# Patient Record
Sex: FEMALE | Race: AMERICAN INDIAN OR ALASKA NATIVE
[De-identification: names, ages, dates, MRNs, and addresses within clinical notes are randomized per-mention and may not be internally consistent; named-entity substitution may affect disease eponyms.]

---

## 2019-06-10 ENCOUNTER — HOSPITAL ENCOUNTER (EMERGENCY)
Dept: HOSPITAL 5 - ED | Age: 24
LOS: 1 days | Discharge: HOME | End: 2019-06-11
Payer: COMMERCIAL

## 2019-06-10 VITALS — SYSTOLIC BLOOD PRESSURE: 132 MMHG | DIASTOLIC BLOOD PRESSURE: 87 MMHG

## 2019-06-10 DIAGNOSIS — N76.0: Primary | ICD-10-CM

## 2019-06-10 DIAGNOSIS — N93.8: ICD-10-CM

## 2019-06-10 LAB
BASOPHILS # (AUTO): 0 K/MM3 (ref 0–0.1)
BASOPHILS NFR BLD AUTO: 0.5 % (ref 0–1.8)
BILIRUB UR QL STRIP: (no result)
BLOOD UR QL VISUAL: (no result)
BUN SERPL-MCNC: 12 MG/DL (ref 7–17)
BUN/CREAT SERPL: 17 %
CALCIUM SERPL-MCNC: 9.1 MG/DL (ref 8.4–10.2)
EOSINOPHIL # BLD AUTO: 0 K/MM3 (ref 0–0.4)
EOSINOPHIL NFR BLD AUTO: 0.7 % (ref 0–4.3)
HCT VFR BLD CALC: 33.5 % (ref 30.3–42.9)
HEMOLYSIS INDEX: 3
HGB BLD-MCNC: 11 GM/DL (ref 10.1–14.3)
LYMPHOCYTES # BLD AUTO: 1.8 K/MM3 (ref 1.2–5.4)
LYMPHOCYTES NFR BLD AUTO: 26.5 % (ref 13.4–35)
MCHC RBC AUTO-ENTMCNC: 33 % (ref 30–34)
MCV RBC AUTO: 85 FL (ref 79–97)
MONOCYTES # (AUTO): 0.5 K/MM3 (ref 0–0.8)
MONOCYTES % (AUTO): 7.1 % (ref 0–7.3)
MUCOUS THREADS #/AREA URNS HPF: (no result) /HPF
PH UR STRIP: 7 [PH] (ref 5–7)
PLATELET # BLD: 309 K/MM3 (ref 140–440)
PROT UR STRIP-MCNC: (no result) MG/DL
RBC # BLD AUTO: 3.95 M/MM3 (ref 3.65–5.03)
RBC #/AREA URNS HPF: 3 /HPF (ref 0–6)
UROBILINOGEN UR-MCNC: < 2 MG/DL (ref ?–2)
WBC #/AREA URNS HPF: < 1 /HPF (ref 0–6)

## 2019-06-10 PROCEDURE — 81001 URINALYSIS AUTO W/SCOPE: CPT

## 2019-06-10 PROCEDURE — 87591 N.GONORRHOEAE DNA AMP PROB: CPT

## 2019-06-10 PROCEDURE — 80048 BASIC METABOLIC PNL TOTAL CA: CPT

## 2019-06-10 PROCEDURE — 99284 EMERGENCY DEPT VISIT MOD MDM: CPT

## 2019-06-10 PROCEDURE — 96372 THER/PROPH/DIAG INJ SC/IM: CPT

## 2019-06-10 PROCEDURE — 81025 URINE PREGNANCY TEST: CPT

## 2019-06-10 PROCEDURE — 84702 CHORIONIC GONADOTROPIN TEST: CPT

## 2019-06-10 PROCEDURE — 87210 SMEAR WET MOUNT SALINE/INK: CPT

## 2019-06-10 PROCEDURE — 85025 COMPLETE CBC W/AUTO DIFF WBC: CPT

## 2019-06-10 PROCEDURE — 36415 COLL VENOUS BLD VENIPUNCTURE: CPT

## 2019-06-10 NOTE — EMERGENCY DEPARTMENT REPORT
ED Female  HPI





- General


Chief complaint: Vaginal Bleeding


Stated complaint: LIGHT HEADED /DIZZY/CRAMPS


Time Seen by Provider: 06/10/19 22:33


Source: patient


Mode of arrival: Ambulatory


Limitations: No Limitations





- History of Present Illness


Initial comments: 





23-year-old female presents to ED with complaint of vaginal bleeding.  Patient 

reports 10 day history of vaginal spotting with associated cramping.  Denies 

vaginal discharge.  Not currently taking any birth control.





GYN: [facility]


MD Complaint: vaginal bleeding


-: days(s) (10)


Location: suprapubic


Severity: mild


Consistency: constant


Improves with: none


Worsens with: none


Associated Symptoms: vaginal bleeding, abdominal pain.  denies: vaginal 

discharge, nausea/vomiting, fever/chills, dysuria, shortness of breath





- Related Data


                                  Previous Rx's











 Medication  Instructions  Recorded  Last Taken  Type


 


Fluconazole [Diflucan TAB] 150 mg PO ONCE #1 tablet 06/10/19 Unknown Rx


 


metroNIDAZOLE [Flagyl] 500 mg PO Q12HR #14 tab 06/10/19 Unknown Rx











                                    Allergies











Allergy/AdvReac Type Severity Reaction Status Date / Time


 


No Known Allergies Allergy   Verified 01/27/14 16:32














ED Review of Systems


ROS: 


Stated complaint: LIGHT HEADED /DIZZY/CRAMPS


Other details as noted in HPI





Comment: All other systems reviewed and negative


Constitutional: denies: chills, fever


Respiratory: denies: shortness of breath


Cardiovascular: denies: chest pain


Gastrointestinal: abdominal pain.  denies: nausea, vomiting


Genitourinary: abnormal menses





ED Past Medical Hx





- Past Medical History


Previous Medical History?: Yes


Hx Hypertension: No


Hx Diabetes: No


Hx Deep Vein Thrombosis: No


Hx Renal Disease: No


Hx Sickle Cell Disease: No


Hx Seizures: No


Hx Asthma: No


Hx HIV: No





- Surgical History


Past Surgical History?: No





- Social History


Smoking Status: Never Smoker


Substance Use Type: None





- Medications


Home Medications: 


                                Home Medications











 Medication  Instructions  Recorded  Confirmed  Last Taken  Type


 


Fluconazole [Diflucan TAB] 150 mg PO ONCE #1 tablet 06/10/19  Unknown Rx


 


metroNIDAZOLE [Flagyl] 500 mg PO Q12HR #14 tab 06/10/19  Unknown Rx














ED Physical Exam





- General


Limitations: No Limitations


General appearance: alert, in no apparent distress





- Head


Head exam: Present: atraumatic, normocephalic





- Eye


Eye exam: Present: normal appearance





- ENT


ENT exam: Present: mucous membranes moist





- Neck


Neck exam: Present: normal inspection





- Respiratory


Respiratory exam: Present: normal lung sounds bilaterally.  Absent: respiratory 

distress





- Cardiovascular


Cardiovascular Exam: Present: regular rate, normal rhythm





- GI/Abdominal


GI/Abdominal exam: Present: soft, tenderness (suprapubic).  Absent: distended





- 


External exam: Present: normal external exam


Speculum exam: Present: vaginal discharge


Bi-manual exam: Absent: cervical motion tendernes





- Extremities Exam


Extremities exam: Present: normal inspection





- Neurological Exam


Neurological exam: Present: alert, oriented X3, normal gait.  Absent: CN II-XII 

intact, motor sensory deficit





- Psychiatric


Psychiatric exam: Present: normal affect, normal mood





- Skin


Skin exam: Present: warm, dry, intact, normal color





ED Course


                                   Vital Signs











  06/10/19 06/10/19 06/10/19





  19:14 19:18 22:38


 


Temperature 99.6 F 99.6 F 


 


Pulse Rate 82 78 


 


Respiratory 18 18 18





Rate   


 


Blood Pressure 132/87 132/87 


 


O2 Sat by Pulse 98 99 





Oximetry   














  06/10/19 06/10/19





  22:44 22:45


 


Temperature  


 


Pulse Rate  


 


Respiratory  





Rate  


 


Blood Pressure  


 


O2 Sat by Pulse 99 98





Oximetry  














ED Medical Decision Making





- Lab Data


Result diagrams: 


                                 06/10/19 19:30





                                 06/10/19 19:30





- Medical Decision Making





- vitals stable


- Hb normal


- pelvic exam shows brownish discharge


- GC/ Chlam empiric abx given


- BV on wet prep


- advised outpt GYN f/u for DUB





- Differential Diagnosis


pregnancy, ectopic, DUB


Critical care attestation.: 


If time is entered above; I have spent that time in minutes in the direct care 

of this critically ill patient, excluding procedure time.








ED Disposition


Clinical Impression: 


 DUB (dysfunctional uterine bleeding), Bacterial vaginosis





Disposition: DC-01 TO HOME OR SELFCARE


Is pt being admited?: No


Condition: Stable


Instructions:  Bacterial Vaginosis (ED)


Prescriptions: 


Fluconazole [Diflucan TAB] 150 mg PO ONCE #1 tablet


metroNIDAZOLE [Flagyl] 500 mg PO Q12HR #14 tab


Referrals: 


PRIMARY CARE,MD [Referring] - 3-5 Days


Forms:  STI Treatment and Prevention


Time of Disposition: 23:58

## 2020-10-01 ENCOUNTER — HOSPITAL ENCOUNTER (EMERGENCY)
Dept: HOSPITAL 5 - ED | Age: 25
LOS: 1 days | Discharge: HOME | End: 2020-10-02
Payer: MEDICAID

## 2020-10-01 VITALS — SYSTOLIC BLOOD PRESSURE: 138 MMHG | DIASTOLIC BLOOD PRESSURE: 84 MMHG

## 2020-10-01 DIAGNOSIS — Z79.1: ICD-10-CM

## 2020-10-01 DIAGNOSIS — Z79.899: ICD-10-CM

## 2020-10-01 DIAGNOSIS — N92.0: Primary | ICD-10-CM

## 2020-10-01 DIAGNOSIS — N93.8: ICD-10-CM

## 2020-10-01 LAB
BASOPHILS # (AUTO): 0 K/MM3 (ref 0–0.1)
BASOPHILS NFR BLD AUTO: 0.5 % (ref 0–1.8)
BILIRUB UR QL STRIP: (no result)
BLOOD UR QL VISUAL: (no result)
EOSINOPHIL # BLD AUTO: 0 K/MM3 (ref 0–0.4)
EOSINOPHIL NFR BLD AUTO: 0.4 % (ref 0–4.3)
HCT VFR BLD CALC: 31.2 % (ref 30.3–42.9)
HGB BLD-MCNC: 10.1 GM/DL (ref 10.1–14.3)
LYMPHOCYTES # BLD AUTO: 1.7 K/MM3 (ref 1.2–5.4)
LYMPHOCYTES NFR BLD AUTO: 23.8 % (ref 13.4–35)
MCHC RBC AUTO-ENTMCNC: 32 % (ref 30–34)
MCV RBC AUTO: 82 FL (ref 79–97)
MONOCYTES # (AUTO): 0.4 K/MM3 (ref 0–0.8)
MONOCYTES % (AUTO): 5.8 % (ref 0–7.3)
MUCOUS THREADS #/AREA URNS HPF: (no result) /HPF
PH UR STRIP: 6 [PH] (ref 5–7)
PLATELET # BLD: 337 K/MM3 (ref 140–440)
PROT UR STRIP-MCNC: (no result) MG/DL
RBC # BLD AUTO: 3.81 M/MM3 (ref 3.65–5.03)
RBC #/AREA URNS HPF: 100 /HPF (ref 0–6)
UROBILINOGEN UR-MCNC: < 2 MG/DL (ref ?–2)
WBC #/AREA URNS HPF: 1 /HPF (ref 0–6)

## 2020-10-01 PROCEDURE — 81001 URINALYSIS AUTO W/SCOPE: CPT

## 2020-10-01 PROCEDURE — 86901 BLOOD TYPING SEROLOGIC RH(D): CPT

## 2020-10-01 PROCEDURE — 76815 OB US LIMITED FETUS(S): CPT

## 2020-10-01 PROCEDURE — 84703 CHORIONIC GONADOTROPIN ASSAY: CPT

## 2020-10-01 PROCEDURE — 86900 BLOOD TYPING SEROLOGIC ABO: CPT

## 2020-10-01 PROCEDURE — 85025 COMPLETE CBC W/AUTO DIFF WBC: CPT

## 2020-10-01 PROCEDURE — 36415 COLL VENOUS BLD VENIPUNCTURE: CPT

## 2020-10-01 NOTE — ULTRASOUND REPORT
ULTRASOUND OBSTETRIC LIMITED 



INDICATION / CLINICAL INFORMATION:

Vaginal bleeding.

Clinical Gestational Age (GA): 1 weeks. 4 days



COMPARISON:

None available.



FINDINGS:



No evidence of intrauterine pregnancy. The uterus is within normal limits for size and demonstrates n
o evidence of focal lesions. The bilateral ovaries are not well seen on this exam secondary to patien
t body habitus and overlying bowel gas. The visualized portions of the adnexa are unremarkable .



ADDITIONAL FINDINGS: None.



IMPRESSION:

1. No evidence of intrauterine pregnancy, however this is likely secondary to the early dating (1 wee
k 4 days). Correlation with pregnancy test and beta hCG values is recommended. Follow-up ultrasound f
or confirmation of intrauterine pregnancy is also recommended.



Signer Name: Nathaniel Teague MD 

Signed: 10/1/2020 11:01 PM

Workstation Name: [id number]

## 2020-10-01 NOTE — EMERGENCY DEPARTMENT REPORT
ED Female  HPI





- General


Chief complaint: Vaginal Bleeding


Stated complaint: VAG BLEEDING


Source: patient


Mode of arrival: Ambulatory


Limitations: No Limitations





- History of Present Illness


Initial comments: 





Patient is a A1 25-year-old -American female with no past medical 

history presents to the ED with complaint of acute onset persistent intermittent

vaginal bleeding and suprapubic pressure and pain for the last 1 month.  Patient

states that she initially thought that the bleeding was from her menstrual cycle

but that the bleeding has been persistent intermittent, worse with sexual 

intercourse.  Patient denies dyspareunia, vaginal discharge, dysuria, urinary 

frequency and urgency, nausea, vomiting, diarrhea, dizziness, traumatic injury, 

fever, chills, cough, chest pain or shortness of breath and low back pain.


MD Complaint: vaginal bleeding, pelvic pain


-: Gradual, month(s) (1)


Location: suprapubic, other (vaginal)


Radiation: suprapubic


Severity: moderate


Severity scale (0 -10): 3


Quality: cramping, dull


Consistency: intermittent


Improves with: none


Worsens with: intercourse


Are you Pregnant Now?: No


Associated Symptoms: denies other symptoms, vaginal bleeding, abdominal pain.  

denies: vaginal discharge, headaches, loss of appetite, hematuria, rash, 

shortness of breath, syncope, other





- Related Data


Sexually active: Yes


: 1


Para: 0


A: 1


                                  Previous Rx's











 Medication  Instructions  Recorded  Last Taken  Type


 


Fluconazole (Nf) [Diflucan TAB] 150 mg PO ONCE #1 tablet 06/10/19 Unknown Rx


 


metroNIDAZOLE [Flagyl] 500 mg PO Q12HR #14 tab 06/10/19 Unknown Rx


 


Ibuprofen [Motrin] 800 mg PO Q8HR PRN #30 tablet 10/02/20 Unknown Rx


 


medroxyPROGESTERone ACETATE 10 mg PO DAILY #10 tablet 10/02/20 Unknown Rx





[Provera]    











                                    Allergies











Allergy/AdvReac Type Severity Reaction Status Date / Time


 


No Known Allergies Allergy   Verified 14 16:32














ED Review of Systems


ROS: 


Stated complaint: VAG BLEEDING


Other details as noted in HPI





Constitutional: denies: chills, fever


Eyes: denies: eye pain, eye discharge, vision change


ENT: denies: ear pain, throat pain


Respiratory: denies: cough, shortness of breath, wheezing


Cardiovascular: denies: chest pain, palpitations


Endocrine: no symptoms reported


Gastrointestinal: abdominal pain (Suprapubic pain).  denies: nausea, vomiting, 

diarrhea


Genitourinary: hematuria, abnormal menses (Vaginal bleeding).  denies: urgency, 

dysuria, discharge


Musculoskeletal: denies: back pain, joint swelling, arthralgia


Skin: denies: rash, lesions


Neurological: denies: headache, weakness, paresthesias


Psychiatric: denies: anxiety, depression


Hematological/Lymphatic: denies: easy bleeding, easy bruising





ED Past Medical Hx





- Past Medical History


Previous Medical History?: No


Hx Hypertension: No


Hx Diabetes: No


Hx Deep Vein Thrombosis: No


Hx Renal Disease: No


Hx Sickle Cell Disease: No


Hx Seizures: No


Hx Asthma: No


Hx HIV: No





- Surgical History


Past Surgical History?: No





- Social History


Smoking Status: Never Smoker


Substance Use Type: None





- Medications


Home Medications: 


                                Home Medications











 Medication  Instructions  Recorded  Confirmed  Last Taken  Type


 


Fluconazole (Nf) [Diflucan TAB] 150 mg PO ONCE #1 tablet 06/10/19  Unknown Rx


 


metroNIDAZOLE [Flagyl] 500 mg PO Q12HR #14 tab 06/10/19  Unknown Rx


 


Ibuprofen [Motrin] 800 mg PO Q8HR PRN #30 tablet 10/02/20  Unknown Rx


 


medroxyPROGESTERone ACETATE 10 mg PO DAILY #10 tablet 10/02/20  Unknown Rx





[Provera]     














ED Physical Exam





- General


Limitations: No Limitations


General appearance: alert, in no apparent distress





- Head


Head exam: Present: atraumatic, normocephalic, normal inspection





- Eye


Eye exam: Present: normal appearance, PERRL, EOMI


Pupils: Present: normal accommodation





- ENT


ENT exam: Present: normal exam, normal orophraynx, mucous membranes moist, TM's 

normal bilaterally, normal external ear exam





- Neck


Neck exam: Present: normal inspection, full ROM





- Respiratory


Respiratory exam: Present: normal lung sounds bilaterally.  Absent: respiratory 

distress, wheezes, rales, rhonchi, chest wall tenderness, accessory muscle use, 

decreased breath sounds





- Cardiovascular


Cardiovascular Exam: Present: regular rate, normal rhythm, normal heart sounds. 

 Absent: systolic murmur, diastolic murmur, rubs, gallop





- GI/Abdominal


GI/Abdominal exam: Present: soft, normal bowel sounds.  Absent: tenderness, 

guarding, rebound, hyperactive bowel sounds, hypoactive bowel sounds





- 


Bi-manual exam: Present: other (Pelvic exam deferred, patient prefers OB/GYN)





- Extremities Exam


Extremities exam: Present: normal inspection, full ROM, normal capillary refill.

  Absent: pedal edema, joint swelling, calf tenderness





- Back Exam


Back exam: Present: normal inspection, full ROM.  Absent: tenderness, CVA 

tenderness (R), CVA tenderness (L), muscle spasm, paraspinal tenderness





- Neurological Exam


Neurological exam: Present: alert, oriented X3, CN II-XII intact, normal gait, 

reflexes normal





- Psychiatric


Psychiatric exam: Present: normal affect, normal mood





- Skin


Skin exam: Present: warm, dry, intact, normal color.  Absent: rash





ED Course





                                   Vital Signs











  10/01/20





  19:23


 


Temperature 98.8 F


 


Pulse Rate 80


 


Respiratory 20





Rate 


 


Blood Pressure 138/84


 


O2 Sat by Pulse 97





Oximetry 














ED Medical Decision Making





- Lab Data


Result diagrams: 


                                 10/01/20 19:58








- Radiology Data


Radiology results: report reviewed, image reviewed





Findings





[facility] 


[address] [place] [address] 





Ultrasound Report 


Signed 





 Patient: JAIMIE MARTINEZ MR#: [id number] 


 [id number] 


 : 1995 Acct:[id number] 





 Age/Sex: 25 / F ADM Date: 10/01/20 





 Loc: ED 


 Attending Dr: 








 Ordering Physician: JESSICA HOWARD 


 Date of Service: 10/01/20 


 Procedure(s): US OB limited 


 Accession Number(s): [id number] 





 cc: JESSICA HOWARD 








 ULTRASOUND OBSTETRIC LIMITED 





INDICATION / CLINICAL INFORMATION: 


Vaginal bleeding. 


Clinical Gestational Age (GA): 1 weeks. 4 days 





COMPARISON: 


None available. 





FINDINGS: 





No evidence of intrauterine pregnancy. The uterus is within normal limits for 

size and demonstrates


 no evidence of focal lesions. The bilateral ovaries are not well seen on this 

exam secondary to 


 patient body habitus and overlying bowel gas. The visualized portions of the 

adnexa are unremarkable


 . 





ADDITIONAL FINDINGS: None. 





IMPRESSION: 


1. No evidence of intrauterine pregnancy, however this is likely secondary to 

the early dating (1 


 week 4 days). Correlation with pregnancy test and beta hCG values is 

recommended. Follow-up 


 ultrasound for confirmation of intrauterine pregnancy is also recommended. 





Signer Name: Nathaniel Whitaker MD 


Signed: 10/1/2020 11:01 PM 


Workstation Name: [id number] 








 Transcribed By:  


 Dictated By: NATHANIEL T. SHERMAN 


 Electronically Authenticated By: NATHANIEL WHITAKER 


 Signed Date/Time: 10/01/20 2301 











 DD/DT: 10/01/20 2256 


 TD/TT: 





- Medical Decision Making





This is a A1 25-year-old -American female with no past medical 

history presents to the ED with complaint of acute onset persistent intermittent

 vaginal bleeding and suprapubic pressure and pain for the last 1 month.  

Patient states that she initially thought that the bleeding was from her 

menstrual cycle but that the bleeding has been persistent intermittent, worse 

with sexual intercourse.  In the ED, patient is alert and oriented x3 and is not

 in distress.  Lab test results were reviewed and are all nonactionable.  

Patient was treated for pain in the ED and transvaginal ultrasound showed no 

acute abnormalities.  Patient was discharged home on medications including 

medroxyprogesterone and was advised to follow-up with her OB/GYN physician as 

previously scheduled for October 15, 2020 for further evaluation.  Patient was 

advised return to the ED immediately if symptoms get worse.





- Differential Diagnosis


Pregnancy; metrorrhagia; DU B; uterine fibroids; cystitis; ovarian cyst


Critical care attestation.: 


If time is entered above; I have spent that time in minutes in the direct care 

of this critically ill patient, excluding procedure time.








ED Disposition


Clinical Impression: 


 Dysfunctional uterine hemorrhage, Menorrhagia with irregular cycle





Disposition:  TO HOME OR SELFCARE


Is pt being admited?: No


Does the pt Need Aspirin: No


Condition: Stable


Instructions:  Menstruation (ED), Menorrhagia (ED)


Additional Instructions: 


All lab test results are unremarkable.  Transvaginal ultrasound is unremarkable 

with no acute abnormalities.  Therefore take medication as advised, follow-up 

with your OB/GYN physician in 5 to 7 days for reevaluation.  Return to the ED 

immediately if symptoms get worse.


Prescriptions: 


Ibuprofen [Motrin] 800 mg PO Q8HR PRN #30 tablet


 PRN Reason: Pain , Severe (7-10)


medroxyPROGESTERone ACETATE [Provera] 10 mg PO DAILY #10 tablet


Referrals: 


JER HARVEY MD [Staff Physician] - 3-5 Days


Time of Disposition: 00:00


Print Language: ENGLISH

## 2020-12-28 ENCOUNTER — HOSPITAL ENCOUNTER (EMERGENCY)
Dept: HOSPITAL 5 - ED | Age: 25
LOS: 1 days | End: 2020-12-29
Payer: SELF-PAY

## 2020-12-28 VITALS — SYSTOLIC BLOOD PRESSURE: 131 MMHG | DIASTOLIC BLOOD PRESSURE: 91 MMHG

## 2020-12-28 DIAGNOSIS — Z53.21: ICD-10-CM

## 2020-12-28 DIAGNOSIS — R51.9: Primary | ICD-10-CM

## 2020-12-28 DIAGNOSIS — M79.10: ICD-10-CM

## 2020-12-28 PROCEDURE — 70450 CT HEAD/BRAIN W/O DYE: CPT

## 2020-12-28 NOTE — EMERGENCY DEPARTMENT REPORT
Blank Doc





- Documentation


Documentation: 





25-year-old female that presents with right sided headache and weakness.  Stated

has been taking OTC medications with no relief.  Denies any cough.  Rashard hx of 

headache like this.





This initial assessment/diagnostic orders/clinical plan/treatment(s) is/are 

subject to change based on patient's health status, clinical progression and re-

assessment by fellow clinical providers in the ED.  Further treatment and workup

at subsequent clinical providers discretion.  Patient/guardians urged not to 

elope from the ED as their condition may be serious if not clinically assessed 

and managed.  Initial orders include:


1- Patient sent to [facility] for further evaluation and treatment


2- CT head

## 2020-12-28 NOTE — CAT SCAN REPORT
CT BRAIN: 12/28/2020



INDICATION / CLINICAL INFORMATION:

Right-sided headache.



COMPARISON: 

4/17/2015



FINDINGS:



BRAIN/INTRACRANIAL STRUCTURES: Unenhanced CT images of the brain demonstrate no evidence of acute int
racranial abnormality.



Ventricles and sulci are normal in size and shape.



There is no evidence of ischemic injury, hemorrhage, or mass. There are no abnormal extra-axial fluid
 collections.



There is been no significant change when compared to 4/17/2015.





EXTRACRANIAL STRUCTURES: Unremarkable.







IMPRESSION:

 Negative unenhanced CT of the brain.









All CT scans at this location are performed using dose reduction to ALARA by means of automated expos
ure control.



Signer Name: Jayant Alex MD 

Signed: 12/28/2020 4:49 PM

Workstation Name: [id number]

## 2021-02-02 ENCOUNTER — HOSPITAL ENCOUNTER (EMERGENCY)
Dept: HOSPITAL 5 - ED | Age: 26
Discharge: HOME | End: 2021-02-02
Payer: SELF-PAY

## 2021-02-02 VITALS — SYSTOLIC BLOOD PRESSURE: 143 MMHG | DIASTOLIC BLOOD PRESSURE: 86 MMHG

## 2021-02-02 DIAGNOSIS — Z3A.11: ICD-10-CM

## 2021-02-02 DIAGNOSIS — O20.0: Primary | ICD-10-CM

## 2021-02-02 DIAGNOSIS — O23.41: ICD-10-CM

## 2021-02-02 DIAGNOSIS — Z79.899: ICD-10-CM

## 2021-02-02 LAB
ALBUMIN SERPL-MCNC: 3.8 G/DL (ref 3.9–5)
ALT SERPL-CCNC: 13 UNITS/L (ref 7–56)
BACTERIA #/AREA URNS HPF: (no result) /HPF
BASOPHILS # (AUTO): 0 K/MM3 (ref 0–0.1)
BASOPHILS NFR BLD AUTO: 0.4 % (ref 0–1.8)
BILIRUB UR QL STRIP: (no result)
BLOOD UR QL VISUAL: (no result)
BUN SERPL-MCNC: 12 MG/DL (ref 7–17)
BUN/CREAT SERPL: 20 %
CALCIUM SERPL-MCNC: 9.1 MG/DL (ref 8.4–10.2)
EOSINOPHIL # BLD AUTO: 0 K/MM3 (ref 0–0.4)
EOSINOPHIL NFR BLD AUTO: 0.4 % (ref 0–4.3)
HCT VFR BLD CALC: 29.3 % (ref 30.3–42.9)
HEMOLYSIS INDEX: 1
HGB BLD-MCNC: 10.2 GM/DL (ref 10.1–14.3)
LYMPHOCYTES # BLD AUTO: 2.2 K/MM3 (ref 1.2–5.4)
LYMPHOCYTES NFR BLD AUTO: 32.5 % (ref 13.4–35)
MCHC RBC AUTO-ENTMCNC: 35 % (ref 30–34)
MCV RBC AUTO: 82 FL (ref 79–97)
MONOCYTES # (AUTO): 0.4 K/MM3 (ref 0–0.8)
MONOCYTES % (AUTO): 6.5 % (ref 0–7.3)
MUCOUS THREADS #/AREA URNS HPF: (no result) /HPF
PH UR STRIP: 5 [PH] (ref 5–7)
PLATELET # BLD: 343 K/MM3 (ref 140–440)
RBC # BLD AUTO: 3.59 M/MM3 (ref 3.65–5.03)
RBC #/AREA URNS HPF: 16 /HPF (ref 0–6)
UROBILINOGEN UR-MCNC: < 2 MG/DL (ref ?–2)
WBC #/AREA URNS HPF: 29 /HPF (ref 0–6)

## 2021-02-02 PROCEDURE — 81001 URINALYSIS AUTO W/SCOPE: CPT

## 2021-02-02 PROCEDURE — 85025 COMPLETE CBC W/AUTO DIFF WBC: CPT

## 2021-02-02 PROCEDURE — 76801 OB US < 14 WKS SINGLE FETUS: CPT

## 2021-02-02 PROCEDURE — 80053 COMPREHEN METABOLIC PANEL: CPT

## 2021-02-02 PROCEDURE — 87086 URINE CULTURE/COLONY COUNT: CPT

## 2021-02-02 PROCEDURE — 84702 CHORIONIC GONADOTROPIN TEST: CPT

## 2021-02-02 PROCEDURE — 36415 COLL VENOUS BLD VENIPUNCTURE: CPT

## 2021-02-02 NOTE — ULTRASOUND REPORT
US OB <= 14 weeks fetus



INDICATION / CLINICAL INFORMATION:

pregnant, bleeding.



COMPARISON:

None available.



FINDINGS:

Intrauterine gestational sac is seen with fetal pole. Crown-rump length is 2.3 cm, 9 weeks 0 days. No
 fetal heart rate was detected. No yolk sac is seen.



There is a 1.9 cm right ovarian cyst. Left ovary is unremarkable.



No free fluid is seen.



IMPRESSION:

1. No embryonic cardiac activity was detected. Findings are suggestive of intrauterine fetal demise, 
correlate with hCG levels. 



Signer Name: Alexis Dumont MD 

Signed: 2/2/2021 1:19 PM

Workstation Name: [id number]

## 2021-02-02 NOTE — EMERGENCY DEPARTMENT REPORT
ED Pregnancy HPI





- General


Chief complaint: Vaginal Bleeding


Stated complaint: 11WKS PREG BLEEDING X 2DAYS


Time Seen by Provider: 21 11:29


Source: patient


Mode of arrival: Ambulatory


Limitations: No Limitations





- History of Present Illness


Initial comments: 





Patient is a 25-year-old female presents emergency room with complaints of 

vaginal bleeding that began 2 days ago.  She denies any significant heavy 

bleeding.  She states that she has passed a few small clots.  She states that 

she is a currently 11 weeks pregnant.  She states her OB/GYN is at [facility] 

[facility].  She states that they did a urine pregnancy test to confirm pregnancy 

but she has not had any other evaluation yet.  She she states that she has some 

"mild slight back pain".  She denies any abdominal pain, pelvic pain, nausea, 

vomiting, diarrhea, dysuria, abnormal vaginal discharge.  No past medical 

history.  No allergies to medications.  /P: 2/A: 1





- Related Data


                                  Previous Rx's











 Medication  Instructions  Recorded  Last Taken  Type


 


Fluconazole (Nf) [Diflucan TAB] 150 mg PO ONCE #1 tablet 06/10/19 Unknown Rx


 


metroNIDAZOLE [Flagyl] 500 mg PO Q12HR #14 tab 06/10/19 Unknown Rx


 


Ibuprofen [Motrin] 800 mg PO Q8HR PRN #30 tablet 10/02/20 Unknown Rx


 


medroxyPROGESTERone ACETATE 10 mg PO DAILY #10 tablet 10/02/20 Unknown Rx





[Provera]    


 


cephALEXin [Keflex] 500 mg PO BID 7 Days #14 cap 21 Unknown Rx











                                    Allergies











Allergy/AdvReac Type Severity Reaction Status Date / Time


 


No Known Allergies Allergy   Verified 21 11:28














ED Review of Systems


ROS: 


Stated complaint: 11WKS PREG BLEEDING X 2DAYS


Other details as noted in HPI





Comment: All other systems reviewed and negative





ED Past Medical Hx





- Past Medical History


Hx Hypertension: No


Hx Diabetes: No


Hx Deep Vein Thrombosis: No


Hx Renal Disease: No


Hx Sickle Cell Disease: No


Hx Seizures: No


Hx Asthma: No


Hx HIV: No





- Social History


Smoking Status: Never Smoker


Substance Use Type: None





- Medications


Home Medications: 


                                Home Medications











 Medication  Instructions  Recorded  Confirmed  Last Taken  Type


 


Fluconazole (Nf) [Diflucan TAB] 150 mg PO ONCE #1 tablet 06/10/19  Unknown Rx


 


metroNIDAZOLE [Flagyl] 500 mg PO Q12HR #14 tab 06/10/19  Unknown Rx


 


Ibuprofen [Motrin] 800 mg PO Q8HR PRN #30 tablet 10/02/20  Unknown Rx


 


medroxyPROGESTERone ACETATE 10 mg PO DAILY #10 tablet 10/02/20  Unknown Rx





[Provera]     


 


cephALEXin [Keflex] 500 mg PO BID 7 Days #14 cap 21  Unknown Rx














ED Physical Exam





- General


Limitations: No Limitations


General appearance: alert, in no apparent distress





- Head


Head exam: Present: atraumatic, normocephalic





- Eye


Eye exam: Present: normal appearance





- ENT


ENT exam: Present: mucous membranes moist





- Respiratory


Respiratory exam: Present: normal lung sounds bilaterally.  Absent: respiratory 

distress, wheezes, rales, rhonchi, stridor, chest wall tenderness, accessory 

muscle use, decreased breath sounds, prolonged expiratory





- Cardiovascular


Cardiovascular Exam: Present: regular rate, normal rhythm, normal heart sounds. 

 Absent: systolic murmur, diastolic murmur, rubs, gallop





- GI/Abdominal


GI/Abdominal exam: Present: soft, normal bowel sounds.  Absent: distended, 

tenderness, guarding, rebound, rigid





- Back Exam


Back exam: Absent: CVA tenderness (R), CVA tenderness (L)





- Neurological Exam


Neurological exam: Present: alert, oriented X3





- Psychiatric


Psychiatric exam: Present: normal affect, normal mood





- Skin


Skin exam: Present: warm, dry, intact





ED Course


                                   Vital Signs











  21





  11:31


 


Temperature 98.3 F


 


Pulse Rate 95 H


 


Respiratory 20





Rate 


 


Blood Pressure 143/86


 


O2 Sat by Pulse 97





Oximetry 














ED Medical Decision Making





- Lab Data


Result diagrams: 


                                 21 11:39





                                 21 11:39








                                   Lab Results











  21 Range/Units





  11:39 11:39 11:39 


 


WBC  6.8    (4.5-11.0)  K/mm3


 


RBC  3.59 L    (3.65-5.03)  M/mm3


 


Hgb  10.2    (10.1-14.3)  gm/dl


 


Hct  29.3 L    (30.3-42.9)  %


 


MCV  82    (79-97)  fl


 


MCH  28    (28-32)  pg


 


MCHC  35 H    (30-34)  %


 


RDW  17.1 H    (13.2-15.2)  %


 


Plt Count  343    (140-440)  K/mm3


 


Lymph % (Auto)  32.5    (13.4-35.0)  %


 


Mono % (Auto)  6.5    (0.0-7.3)  %


 


Eos % (Auto)  0.4    (0.0-4.3)  %


 


Baso % (Auto)  0.4    (0.0-1.8)  %


 


Lymph # (Auto)  2.2    (1.2-5.4)  K/mm3


 


Mono # (Auto)  0.4    (0.0-0.8)  K/mm3


 


Eos # (Auto)  0.0    (0.0-0.4)  K/mm3


 


Baso # (Auto)  0.0    (0.0-0.1)  K/mm3


 


Seg Neutrophils %  60.2    (40.0-70.0)  %


 


Seg Neutrophils #  4.1    (1.8-7.7)  K/mm3


 


Sodium   137   (137-145)  mmol/L


 


Potassium   4.0   (3.6-5.0)  mmol/L


 


Chloride   107.1 H   ()  mmol/L


 


Carbon Dioxide   23   (22-30)  mmol/L


 


Anion Gap   11   mmol/L


 


BUN   12   (7-17)  mg/dL


 


Creatinine   0.6   (0.6-1.2)  mg/dL


 


Estimated GFR   > 60   ml/min


 


BUN/Creatinine Ratio   20   %


 


Glucose   91   ()  mg/dL


 


Calcium   9.1   (8.4-10.2)  mg/dL


 


Total Bilirubin   0.20   (0.1-1.2)  mg/dL


 


AST   13   (5-40)  units/L


 


ALT   13   (7-56)  units/L


 


Alkaline Phosphatase   60   ()  units/L


 


Total Protein   7.4   (6.3-8.2)  g/dL


 


Albumin   3.8 L   (3.9-5)  g/dL


 


Albumin/Globulin Ratio   1.1   %


 


HCG, Quant    33349 H  (0-4)  mIU/mL


 


Urine Color     (Yellow)  


 


Urine Turbidity     (Clear)  


 


Urine pH     (5.0-7.0)  


 


Ur Specific Gravity     (1.003-1.030)  


 


Urine Protein     (Negative)  mg/dL


 


Urine Glucose (UA)     (Negative)  mg/dL


 


Urine Ketones     (Negative)  mg/dL


 


Urine Blood     (Negative)  


 


Urine Nitrite     (Negative)  


 


Urine Bilirubin     (Negative)  


 


Urine Urobilinogen     (<2.0)  mg/dL


 


Ur Leukocyte Esterase     (Negative)  


 


Urine WBC (Auto)     (0.0-6.0)  /HPF


 


Urine RBC (Auto)     (0.0-6.0)  /HPF


 


U Epithel Cells (Auto)     (0-13.0)  /HPF


 


Urine Bacteria (Auto)     (Negative)  /HPF


 


Urine Mucus     /HPF














  21 Range/Units





  11:48 


 


WBC   (4.5-11.0)  K/mm3


 


RBC   (3.65-5.03)  M/mm3


 


Hgb   (10.1-14.3)  gm/dl


 


Hct   (30.3-42.9)  %


 


MCV   (79-97)  fl


 


MCH   (28-32)  pg


 


MCHC   (30-34)  %


 


RDW   (13.2-15.2)  %


 


Plt Count   (140-440)  K/mm3


 


Lymph % (Auto)   (13.4-35.0)  %


 


Mono % (Auto)   (0.0-7.3)  %


 


Eos % (Auto)   (0.0-4.3)  %


 


Baso % (Auto)   (0.0-1.8)  %


 


Lymph # (Auto)   (1.2-5.4)  K/mm3


 


Mono # (Auto)   (0.0-0.8)  K/mm3


 


Eos # (Auto)   (0.0-0.4)  K/mm3


 


Baso # (Auto)   (0.0-0.1)  K/mm3


 


Seg Neutrophils %   (40.0-70.0)  %


 


Seg Neutrophils #   (1.8-7.7)  K/mm3


 


Sodium   (137-145)  mmol/L


 


Potassium   (3.6-5.0)  mmol/L


 


Chloride   ()  mmol/L


 


Carbon Dioxide   (22-30)  mmol/L


 


Anion Gap   mmol/L


 


BUN   (7-17)  mg/dL


 


Creatinine   (0.6-1.2)  mg/dL


 


Estimated GFR   ml/min


 


BUN/Creatinine Ratio   %


 


Glucose   ()  mg/dL


 


Calcium   (8.4-10.2)  mg/dL


 


Total Bilirubin   (0.1-1.2)  mg/dL


 


AST   (5-40)  units/L


 


ALT   (7-56)  units/L


 


Alkaline Phosphatase   ()  units/L


 


Total Protein   (6.3-8.2)  g/dL


 


Albumin   (3.9-5)  g/dL


 


Albumin/Globulin Ratio   %


 


HCG, Quant   (0-4)  mIU/mL


 


Urine Color  Annita  (Yellow)  


 


Urine Turbidity  Cloudy  (Clear)  


 


Urine pH  5.0  (5.0-7.0)  


 


Ur Specific Gravity  1.028  (1.003-1.030)  


 


Urine Protein  30 mg/dl  (Negative)  mg/dL


 


Urine Glucose (UA)  Neg  (Negative)  mg/dL


 


Urine Ketones  Neg  (Negative)  mg/dL


 


Urine Blood  Lg  (Negative)  


 


Urine Nitrite  Neg  (Negative)  


 


Urine Bilirubin  Neg  (Negative)  


 


Urine Urobilinogen  < 2.0  (<2.0)  mg/dL


 


Ur Leukocyte Esterase  Mod  (Negative)  


 


Urine WBC (Auto)  29.0 H  (0.0-6.0)  /HPF


 


Urine RBC (Auto)  16.0  (0.0-6.0)  /HPF


 


U Epithel Cells (Auto)  38.0 H  (0-13.0)  /HPF


 


Urine Bacteria (Auto)  1+  (Negative)  /HPF


 


Urine Mucus  3+  /HPF














- Radiology Data


Radiology results: report reviewed





US OB <= 14 weeks fetus 





INDICATION / CLINICAL INFORMATION: 


pregnant, bleeding. 





COMPARISON: 


None available. 





FINDINGS: 


Intrauterine gestational sac is seen with fetal pole. Crown-rump length is 2.3 

cm, 9 weeks 0 days. 


 No fetal heart rate was detected. No yolk sac is seen. 





There is a 1.9 cm right ovarian cyst. Left ovary is unremarkable. 





No free fluid is seen. 





IMPRESSION: 


1. No embryonic cardiac activity was detected. Findings are suggestive of intr

auterine fetal 


 demise, correlate with hCG levels. 





Signer Name: Alexis Dumont MD 


Signed: 2021 1:19 PM 


Workstation Name: [id number] 








 Transcribed By: TICO 


 Dictated By: Alexis Dumont MD 


 Electronically Authenticated By: Alexis Dumont MD 


 Signed Date/Time: 21 1319 











 DD/DT: 21 1317 


 TD/TT: 





- Medical Decision Making





Patient is a 25-year-old female presents emergency room with complaints of 

vaginal bleeding that began 2 days ago.  She denies any significant heavy 

bleeding.  She states that she has passed a few small clots.  She states that mandy roque is a currently 11 weeks pregnant.  She states her OB/GYN is at [facility] 

[facility].  She states that they did a urine pregnancy test to confirm pregnancy 

but she has not had any other evaluation yet.  She she states that she has some 

"mild slight back pain".  She denies any abdominal pain, pelvic pain, nausea, 

vomiting, diarrhea, dysuria, abnormal vaginal discharge.  No past medical 

history.  No allergies to medications.  /P: 2/A: 1.  Vitals are normal.  No 

abdominal tenderness on exam, no guarding, no rebound, no rigidity, normal bowel

 sounds, no peritoneal signs[place]  [place] [address], otherwise labs are normal.  

Upon chart review, patient is Rh+.  UA shows evidence of UTI, there are many 

epithelial cells, could be due to contamination, will cover patient with Keflex 

and urine culture was sent, advised to follow-up with a primary care doctor for 

urinary testing.  OB ultrasound 1. No embryonic cardiac activity was detected. 

Findings are suggestive of intrauterine fetal demise, correlate with hCG levels.

  Discussed all results with patient and answered questions.  Patient was given 

her ultrasound report to take to her OB/GYN.  Discussed the importance of OB/GYN

 follow-up within the next 2 days.  Discussed that this is a threatened 

miscarriage with likely fetal demise.  Advised patient Please increase your 

water intake.  May take Tylenol as needed for discomfort.  Please take me

dication as prescribed.  Please practice pelvic rest.  Follow-up with your 

OB/GYN at [facility] within the next 2 days.  Please discuss with your OB/GYN 

regarding her ultrasound findings.  Return to emergency room for any new or 

worsening symptoms.


Critical care attestation.: 


If time is entered above; I have spent that time in minutes in the direct care 

of this critically ill patient, excluding procedure time.








ED Disposition


Clinical Impression: 


 Threatened miscarriage





UTI (urinary tract infection)


Qualifiers:


 Urinary tract infection type: acute cystitis Hematuria presence: with hematuria

 Qualified Code(s): N30.01 - Acute cystitis with hematuria





Disposition:  TO HOME OR SELFCARE


Is pt being admited?: No


Does the pt Need Aspirin: No


Condition: Stable


Instructions:  Threatened Miscarriage, Urinary Tract Infection, Adult, 

Easy-to-Read


Additional Instructions: 


Please increase your water intake.  May take Tylenol as needed for discomfort.  

Please take medication as prescribed.  Please practice pelvic rest.  Follow-up 

with your OB/GYN at [facility] within the next 2 days.  Please discuss with your 

OB/GYN regarding her ultrasound findings.  Return to emergency room for any new 

or worsening symptoms.


Prescriptions: 


cephALEXin [Keflex] 500 mg PO BID 7 Days #14 cap


Referrals: 


[facility] [Provider Group] - 2-3 Days


[facility] [Primary Care Provider] - 2-3 Days


Time of Disposition: 13:29


Print Language: ENGLISH

## 2021-10-27 ENCOUNTER — HOSPITAL ENCOUNTER (EMERGENCY)
Dept: HOSPITAL 5 - ED | Age: 26
LOS: 1 days | Discharge: HOME | End: 2021-10-28
Payer: SELF-PAY

## 2021-10-27 VITALS — SYSTOLIC BLOOD PRESSURE: 122 MMHG | DIASTOLIC BLOOD PRESSURE: 75 MMHG

## 2021-10-27 DIAGNOSIS — O46.8X1: Primary | ICD-10-CM

## 2021-10-27 DIAGNOSIS — Z3A.00: ICD-10-CM

## 2021-10-27 DIAGNOSIS — N93.9: ICD-10-CM

## 2021-10-27 PROCEDURE — 81001 URINALYSIS AUTO W/SCOPE: CPT

## 2021-10-27 PROCEDURE — 36415 COLL VENOUS BLD VENIPUNCTURE: CPT

## 2021-10-27 PROCEDURE — 99283 EMERGENCY DEPT VISIT LOW MDM: CPT

## 2021-10-27 PROCEDURE — 85025 COMPLETE CBC W/AUTO DIFF WBC: CPT

## 2021-10-27 PROCEDURE — 84702 CHORIONIC GONADOTROPIN TEST: CPT

## 2021-10-28 LAB
BASOPHILS # (AUTO): 0 K/MM3 (ref 0–0.1)
BASOPHILS NFR BLD AUTO: 0.7 % (ref 0–1.8)
BILIRUB UR QL STRIP: (no result)
BLOOD UR QL VISUAL: (no result)
EOSINOPHIL # BLD AUTO: 0.1 K/MM3 (ref 0–0.4)
EOSINOPHIL NFR BLD AUTO: 0.9 % (ref 0–4.3)
HCT VFR BLD CALC: 31.3 % (ref 30.3–42.9)
HGB BLD-MCNC: 9.9 GM/DL (ref 10.1–14.3)
LYMPHOCYTES # BLD AUTO: 1.7 K/MM3 (ref 1.2–5.4)
LYMPHOCYTES NFR BLD AUTO: 29.1 % (ref 13.4–35)
MCHC RBC AUTO-ENTMCNC: 32 % (ref 30–34)
MCV RBC AUTO: 80 FL (ref 79–97)
MONOCYTES # (AUTO): 0.4 K/MM3 (ref 0–0.8)
MONOCYTES % (AUTO): 7.3 % (ref 0–7.3)
MUCOUS THREADS #/AREA URNS HPF: (no result) /HPF
PH UR STRIP: 7 [PH] (ref 5–7)
PLATELET # BLD: 325 K/MM3 (ref 140–440)
PROT UR STRIP-MCNC: (no result) MG/DL
RBC # BLD AUTO: 3.92 M/MM3 (ref 3.65–5.03)
RBC #/AREA URNS HPF: 4 /HPF (ref 0–6)
UROBILINOGEN UR-MCNC: < 2 MG/DL (ref ?–2)
WBC #/AREA URNS HPF: 1 /HPF (ref 0–6)

## 2021-10-28 NOTE — EMERGENCY DEPARTMENT REPORT
ED Female  HPI





- General


Chief complaint: Vaginal Bleeding


Stated complaint: POSS MISCARRIAGE


Time Seen by Provider: 10/27/21 23:59


Source: patient, EMS


Mode of arrival: Stretcher


Limitations: No Limitations





- History of Present Illness


Initial comments: 





26-year-old -American female with suspected pregnancy presents emerged 

department complaining of pelvic cramping and vaginal bleeding started tonight 

while she was at work..  Patient states that she took a pregnancy test a few 

days ago and it was positive and and she had some spotting on yesterday which 

progressed to some mild to moderate bleeding while at work with a blood clot 

being passed which triggered her coming to the emergency department today.  She 

reports no abdominal trauma, no fever, chills, sweats, no presyncope, no chest 

pain, no palpitations and no vaginal discharge has not yet had prenatal care 

visit would be very early in the pregnancy if she is in fact pregnant at about 3

to 4 weeks


MD Complaint: vaginal bleeding


-: Gradual


Location: suprapubic


Radiation: non-radiating


Severity: mild


Quality: cramping, dull


Consistency: constant


Improves with: none


Worsens with: none


Are you Pregnant Now?: Yes


Associated Symptoms: vaginal bleeding, abdominal pain.  denies: loss of 

appetite, hematuria, shortness of breath, syncope, weakness





- Related Data


Sexually active: Yes


                                  Previous Rx's











 Medication  Instructions  Recorded  Last Taken  Type


 


Fluconazole (Nf) [Diflucan TAB] 150 mg PO ONCE #1 tablet 06/10/19 Unknown Rx


 


metroNIDAZOLE [Flagyl] 500 mg PO Q12HR #14 tab 06/10/19 Unknown Rx


 


Ibuprofen [Motrin] 800 mg PO Q8HR PRN #30 tablet 10/02/20 Unknown Rx


 


medroxyPROGESTERone ACETATE 10 mg PO DAILY #10 tablet 10/02/20 Unknown Rx





[Provera]    


 


cephALEXin [Keflex] 500 mg PO BID 7 Days #14 cap 02/02/21 Unknown Rx











                                    Allergies











Allergy/AdvReac Type Severity Reaction Status Date / Time


 


No Known Allergies Allergy   Verified 02/02/21 11:28














ED Review of Systems


ROS: 


Stated complaint: POSS MISCARRIAGE


Other details as noted in HPI








ED Past Medical Hx





- Past Medical History


Previous Medical History?: No


Hx Hypertension: No


Hx Diabetes: No


Hx Deep Vein Thrombosis: No


Hx Renal Disease: No


Hx Sickle Cell Disease: No


Hx Seizures: No


Hx Asthma: No


Hx HIV: No





- Surgical History


Past Surgical History?: No





- Social History


Smoking Status: Never Smoker


Substance Use Type: None





- Medications


Home Medications: 


                                Home Medications











 Medication  Instructions  Recorded  Confirmed  Last Taken  Type


 


Fluconazole (Nf) [Diflucan TAB] 150 mg PO ONCE #1 tablet 06/10/19  Unknown Rx


 


metroNIDAZOLE [Flagyl] 500 mg PO Q12HR #14 tab 06/10/19  Unknown Rx


 


Ibuprofen [Motrin] 800 mg PO Q8HR PRN #30 tablet 10/02/20  Unknown Rx


 


medroxyPROGESTERone ACETATE 10 mg PO DAILY #10 tablet 10/02/20  Unknown Rx





[Provera]     


 


cephALEXin [Keflex] 500 mg PO BID 7 Days #14 cap 02/02/21  Unknown Rx














ED Physical Exam





- General


Limitations: No Limitations





ED Course





                                   Vital Signs











  10/27/21





  23:30


 


Temperature 98.4 F


 


Pulse Rate 72


 


Respiratory 18





Rate 


 


Blood Pressure 122/75





[Left] 


 


O2 Sat by Pulse 95





Oximetry 














ED Medical Decision Making





- Lab Data


Result diagrams: 


                                 10/28/21 00:48





Critical care attestation.: 


If time is entered above; I have spent that time in minutes in the direct care 

of this critically ill patient, excluding procedure time.








ED Disposition


Clinical Impression: 


 Vaginal bleeding





Disposition: 01 HOME / SELF CARE / HOMELESS


Is pt being admited?: No


Does the pt Need Aspirin: No


Condition: Stable


Instructions:  Abnormal Uterine Bleeding, Dysfunctional Uterine Bleeding


Additional Instructions: 


Your hemoglobin quant is 2.79 at this present time is consistent with a negative

  pregnancy test is negative we will hold off on ultrasound as is not indicated 

with hCG at this range please reevaluate your hCG in 3 to 4days at which point 

time it should steadily be increasing if you are in fact pregnant.  Follow-up 

with your primary care provider or OB/GYN for this test.  Please use Tylenol as 

needed for any discomfort, maintaining the appropriate hydration as well


Referrals: 


[facility] [Provider Group] - 3-5 Days